# Patient Record
Sex: FEMALE | Race: WHITE
[De-identification: names, ages, dates, MRNs, and addresses within clinical notes are randomized per-mention and may not be internally consistent; named-entity substitution may affect disease eponyms.]

---

## 2018-08-24 ENCOUNTER — HOSPITAL ENCOUNTER (EMERGENCY)
Dept: HOSPITAL 80 - FED | Age: 28
Discharge: HOME | End: 2018-08-24
Payer: COMMERCIAL

## 2018-08-24 VITALS — SYSTOLIC BLOOD PRESSURE: 126 MMHG | DIASTOLIC BLOOD PRESSURE: 78 MMHG

## 2018-08-24 DIAGNOSIS — F32.89: Primary | ICD-10-CM

## 2018-08-24 DIAGNOSIS — E03.9: ICD-10-CM

## 2018-08-24 DIAGNOSIS — R45.851: ICD-10-CM

## 2018-08-24 LAB — PLATELET # BLD: 292 10^3/UL (ref 150–400)

## 2018-08-24 PROCEDURE — G0480 DRUG TEST DEF 1-7 CLASSES: HCPCS

## 2018-08-24 PROCEDURE — GZ11ZZZ PSYCHOLOGICAL TESTS, PERSONALITY AND BEHAVIORAL: ICD-10-PCS | Performed by: EMERGENCY MEDICINE

## 2018-08-24 NOTE — ASMTTCLDSP
[facility] Discharge Disposition

 

Disposition:                  Answers:  Discharge                             

If                            Answers:  Yes                                   

DISCHARGED: Patient/family                                                    

 given suicide hotline                                                        

info & SAMHSA brochure?                                                       

Disposition Notes:            

Notes:

In consultation with [facility] ED physician, Ayaz Carrillo MD, and on-call psychiatrist, Austin Thompson MD, both concurred that pt does not appear to meet 27-65 criteria requiring psychiatric 

hospitalization as pt does not appear to be an imminent risk of harm to self due to a mental 

illness condition.

Discharge                     

Concerns/Recommendations:     

Notes:

Pt and her  were coached around family therapy, transition issues, and coping strategies. Pt 


has resources available and outpatient appts made for Monday and Thursday. Pt contracts for safety 

and was given crisis resources should thoughts and impulses to act on them return. 

 

Date Signed:  08/24/2018 09:19 PM

Electronically Signed By:Castro Ibarra

## 2018-08-24 NOTE — EDPHY
H & P


Time Seen by Provider: 08/24/18 13:18


HPI/ROS: 





CHIEF COMPLAINT:  Depression, suicidal ideation





HISTORY OF PRESENT ILLNESS:  Patient is a 27-year-old female with a history of 

depression.  She presents to the emergency department because she is having 

increasing depression and suicidal thoughts.  She has no specific plan.  She 

denies any ingestion or self-harm.  She has had no previous suicide attempts.  

She has not changed any of her medications.  She denies drugs or alcohol.





REVIEW OF SYSTEMS:  


My complete review of systems is negative except as mentioned in the HPI.


Past Medical/Surgical History: 





Includes hypothyroidism





Past surgical history:  Includes tonsillectomy





Social history:  Patient denies drugs or alcohol.


Smoking Status: Never smoked


Physical Exam: 








GENERAL:  No acute distress, alert.


HEENT:  Eyes normal to inspection, normal pharynx, no signs of dehydration.


NECK:  [No thyromegaly, no lymphadenopathy, supple.


RESPIRATORY:  Clear to auscultation bilaterally, no rales, rhonchi or wheezing.


CVS:  Regular rate and rhythm, no rubs, murmurs, or gallops.


ABDOMEN:  Soft, nontender, nondistended, no organomegaly.


BACK:  Normal to inspection, no CVA tenderness.


SKIN:  Normal color, no rash, warm, dry.  No pallor.


EXTREMITIES:  No pedal edema, no calf tenderness, no Homans sign or cords, no 

joint swelling.


NEURO/PSYCH:  Alert and oriented x3, flat affect, normal motor sensory exam.  


Constitutional: 


 Initial Vital Signs











Temperature (C)  36.5 C   08/24/18 12:55


 


Heart Rate  89   08/24/18 12:55


 


Respiratory Rate  18   08/24/18 12:55


 


Blood Pressure  131/82 H  08/24/18 12:55


 


O2 Sat (%)  95   08/24/18 12:55








 











O2 Delivery Mode               Room Air














Allergies/Adverse Reactions: 


 





cefaclor [From Ceclor] Allergy (Verified 08/24/18 12:54)


 








Home Medications: 














 Medication  Instructions  Recorded


 


Levothyroxine  08/24/18














Medical Decision Making


ED Course/Re-evaluation: 





In the emergency department I discussed possible etiologies with the patient.  

I answered all her questions.  An IV was placed.  Laboratory studies were 

obtained.  Psychiatric consult was obtained.





Chemistry panel is unremarkable.  Alcohol is less than 10





Psychiatric Services evaluated the patient.  They felt this is adjustment 

disorder.  They felt the patient be safely discharged home.  This was discussed 

with Dr. Jama from Psychiatry.  Please refer the note.


Differential Diagnosis: 





My differential includes but is not limited to depression, suicidal ideation, 

thyroid disease, congestion, self-harm, toxication, drug use





- Data Points


Laboratory Results: 


 Laboratory Results





 08/24/18 13:02 





 08/24/18 13:20 





 











  08/24/18 08/24/18 08/24/18





  13:25 13:20 13:20


 


WBC      





    


 


RBC      





    


 


Hgb      





    


 


Hct      





    


 


MCV      





    


 


MCH      





    


 


MCHC      





    


 


RDW      





    


 


Plt Count      





    


 


MPV      





    


 


Neut % (Auto)      





    


 


Lymph % (Auto)      





    


 


Mono % (Auto)      





    


 


Eos % (Auto)      





    


 


Baso % (Auto)      





    


 


Nucleat RBC Rel Count      





    


 


Absolute Neuts (auto)      





    


 


Absolute Lymphs (auto)      





    


 


Absolute Monos (auto)      





    


 


Absolute Eos (auto)      





    


 


Absolute Basos (auto)      





    


 


Absolute Nucleated RBC      





    


 


Immature Gran %      





    


 


Immature Gran #      





    


 


Sodium      139 mEq/L mEq/L





     (135-145) 


 


Potassium      4.0 mEq/L mEq/L





     (3.3-5.0) 


 


Chloride      104 mEq/L mEq/L





     () 


 


Carbon Dioxide      22 mEq/l mEq/l





     (22-31) 


 


Anion Gap      13 mEq/L mEq/L





     (8-16) 


 


BUN      20 mg/dL mg/dL





     (7-23) 


 


Creatinine      0.8 mg/dL mg/dL





     (0.6-1.0) 


 


Estimated GFR      > 60 





    


 


Glucose      105 mg/dL H mg/dL





     () 


 


Calcium      9.9 mg/dL mg/dL





     (8.5-10.4) 


 


TSH      1.080 uIU/mL uIU/mL





     (0.465-4.680) 


 


Beta HCG, Qual    NEGATIVE   





    


 


Urine Opiates Screen  NEGATIVE     





   (NEGATIVE)   


 


Urine Barbiturates  NEGATIVE     





   (NEGATIVE)   


 


Ur Phencyclidine Scrn  NEGATIVE     





   (NEGATIVE)   


 


Ur Amphetamine Screen  NEGATIVE     





   (NEGATIVE)   


 


U Benzodiazepines Scrn  NON-NEGATIVE  H     





   (NEGATIVE)   


 


Urine Cocaine Screen  NEGATIVE     





   (NEGATIVE)   


 


U Marijuana (THC) Screen  NEGATIVE     





   (NEGATIVE)   


 


Ethyl Alcohol      < 10 mg/dL mg/dL





     (0-10) 














  08/24/18





  13:02


 


WBC  6.27 10^3/uL 10^3/uL





   (3.80-9.50) 


 


RBC  4.70 10^6/uL 10^6/uL





   (4.18-5.33) 


 


Hgb  14.2 g/dL g/dL





   (12.6-16.3) 


 


Hct  41.6 % %





   (38.0-47.0) 


 


MCV  88.5 fL fL





   (81.5-99.8) 


 


MCH  30.2 pg pg





   (27.9-34.1) 


 


MCHC  34.1 g/dL g/dL





   (32.4-36.7) 


 


RDW  12.4 % %





   (11.5-15.2) 


 


Plt Count  292 10^3/uL 10^3/uL





   (150-400) 


 


MPV  11.1 fL fL





   (8.7-11.7) 


 


Neut % (Auto)  49.3 % %





   (39.3-74.2) 


 


Lymph % (Auto)  36.4 % %





   (15.0-45.0) 


 


Mono % (Auto)  9.4 % %





   (4.5-13.0) 


 


Eos % (Auto)  3.7 % %





   (0.6-7.6) 


 


Baso % (Auto)  1.0 % %





   (0.3-1.7) 


 


Nucleat RBC Rel Count  0.0 % %





   (0.0-0.2) 


 


Absolute Neuts (auto)  3.10 10^3/uL 10^3/uL





   (1.70-6.50) 


 


Absolute Lymphs (auto)  2.28 10^3/uL 10^3/uL





   (1.00-3.00) 


 


Absolute Monos (auto)  0.59 10^3/uL 10^3/uL





   (0.30-0.80) 


 


Absolute Eos (auto)  0.23 10^3/uL 10^3/uL





   (0.03-0.40) 


 


Absolute Basos (auto)  0.06 10^3/uL 10^3/uL





   (0.02-0.10) 


 


Absolute Nucleated RBC  0.00 10^3/uL 10^3/uL





   (0-0.01) 


 


Immature Gran %  0.2 % %





   (0.0-1.1) 


 


Immature Gran #  0.01 10^3/uL 10^3/uL





   (0.00-0.10) 


 


Sodium  





  


 


Potassium  





  


 


Chloride  





  


 


Carbon Dioxide  





  


 


Anion Gap  





  


 


BUN  





  


 


Creatinine  





  


 


Estimated GFR  





  


 


Glucose  





  


 


Calcium  





  


 


TSH  





  


 


Beta HCG, Qual  





  


 


Urine Opiates Screen  





  


 


Urine Barbiturates  





  


 


Ur Phencyclidine Scrn  





  


 


Ur Amphetamine Screen  





  


 


U Benzodiazepines Scrn  





  


 


Urine Cocaine Screen  





  


 


U Marijuana (THC) Screen  





  


 


Ethyl Alcohol  





  














Departure





- Departure


Disposition: Home, Routine, Self-Care


Clinical Impression: 


Depression


Qualifiers:


 Depression Type: other depression Qualified Code(s): F32.89 - Other specified 

depressive episodes





Condition: Good


Instructions:  Depression (ED)


Additional Instructions: 


Return with increasing depression, thoughts of hurting yourself or someone else

, or any other concerns.


Referrals: 


[facility] [Medical Doctor] - 3-4 days, if not improved

## 2018-08-24 NOTE — ASMTTLCEVL
[facility] Evaluation - Basic Information

 

Evaluation Start Date and     08/24/2018 04:00 PM

Time                          

Hospital Status               Answers:  Voluntary                             

Patient statement             

Notes:

I'm here because I've been depressed for 3 months and I'd just like a pill or something to make me 

feel better"

Narrative                     

Notes:

Pt is a 26 yo caucassion female  with no children, employed, masters level 

education, presenting voluntarily to the ED via private vehicle for a mental health eval due to 

persistent depression.

Diagnosis History             

Notes:

No previous dx hx. Pt reported shes been feeling depressed for 3 months since moving to [place] 

from [place]

Prior suicide attempts        

Notes:

None Reported; however pt did report last night she had taken out a knife with the intention of 

cutting to relieve pain and only selfharm, but her  found out and was upset 

Prior hospitalizations        

Notes:

None reported

Treatment Responses           

Notes:

Pt reported that she had been seeing a therapist she didnt' vibe with and stopped going 3 weeks ago 


but she had a new appt with a different one on monday. Pt also had an appt scheduled with her 

family psychiatirst back in [place] on Thursday. 

History of violence           

Notes:

None reported

Therapist:                    New one Monday

Psychiatrist:                 Family psychiatrist in [place]

Medications                   

(name, dosage, route, freq    

uency)                        

Notes:

Atavan 1mg every 4 hours prn to sleep. 

Allergies/Reaction            

Notes:

Ceclor, Biaxin and glutin

Sleep                         

Notes:

8-9 hours a night but feels she sleeps too much

Appetite                      

Notes:

Low

Medical/Surgical history      

Notes:

None Reported

Substance use history         

(frequency, intensity, his    

tory, duration)               

Notes:

None Reported

Family composition            

Notes:

Pt's parents are together and she has a younger brother and older sister. 

Need for family               Answers:  Yes                                   

participation in                                                              

patient's care                                                                

Family                        

psychiatric/substance         

abuse history                 

Notes:

Pt's father has bipolar, pt's mother has anxiety, pt's sister has a panic disorder, pt's brother 

has depression. 

Developmental history         

Notes:

Pt denied any previous developmental hx; denied add, adhd, denied TBI, LOC, Concussions, and denied 


childhood abuse. 

Abuse concerns                Answers:  None                                  

Marital status/children       

Notes:

 no children. 

Sexual                        

history/orientation           

Notes:

Heterosexual and Active 

Education level/history       

Notes:

BA psychology; MA in public health administration. 

Work history                  

Notes:

Employed as a clinical abstractionist. 

                      

Notes:

None reported

Legal                         

Notes:

None reported

Restorationism/Spiritual           

Notes:

Pt is spiritual 

Leisure                       

Notes:

Travel, Spending time with family, netflix, jazercise, and animals. 

Collateral                    

Notes:

Pt's  whom was present at bedside. 

Patient's strengths           Answers:  Funny/Using Humor                     

(Please select at least                                                       

TWO strengths):                                                               

                                        Good Friend to Others                 

                                        Honest                                

                                        Insightful                            

                                        Intelligent                           

                                        [place]                                 

                                        Motivated for Treatment               

                                        Responsible/Dependable                

                                        Supportive/Compassionate              

                                        Supportive Family                     

                                        Willingness                           

[facility] Evaluation - Mental Status Exam

 

Appearance:                   Answers:  Appropriate                           

                                        Clean                                 

                                        Well Groomed                          

                                        Neat                                  

Eye Contact:                  Answers:  Good/Direct                           

Mood:                         Answers:  Depressed                             

                                        Irritable                             

                                        Sad                                   

Affect:                       Answers:  Appropriate                           

                                        Blunted                               

                                        Sad                                   

Behavior:                     Answers:  Appropriate                           

                                        Cooperative                           

                                        Talkative                             

Speech:                       Answers:  Relevant                              

                                        Logical                               

                                        Clear                                 

                                        Coherent                              

Thought Process:              Answers:  Organized                             

                                        Oriented                              

                                        Alert                                 

                                        Goal Oriented                         

Insight:                      Answers:  Good                                  

Judgement:                    Answers:  Good                                  

Manic Signs/Symptoms          Answers:  Irritability                          

Depression                    Answers:  Crying Spells                         

Signs/Symptoms:                                                               

                                        Difficulty Concentrating              

                                        Diminished Interest                   

                                        Diminished Pleasure                   

                                        Hopelessness                          

                                        Psychomotor Agitation                 

                                        Psychomotor Retardation               

                                        Sad Mood                              

                                        Withdrawn                             

Anxiety Signs/Symptoms        Answers:  Generalized Anxiety                   

Hallucinations:               Answers:  None                                  

Pt reported to have           Answers:  Yes                                   

suicidal/self-injuring                                                        

ideation/behavior?                                                            

Pt reported to be making      Answers:  No                                    

suicidal/self-injuring                                                        

threats?                                                                      

Pt reported to be making      Answers:  No                                    

aggression/assault                                                            

threats?                                                                      

Pt exhibits inability to      Answers:  No                                    

care for self/grave                                                           

disability?                                                                   

Ideation/behavior is          Answers:  No                                    

chronic?                                                                      

Ideation has                  Answers:  No                                    

delusional/hallucinatory                                                      

content?                                                                      

History of                    Answers:  No                                    

suicidal/self-injuring                                                        

ideation, behavior, or                                                        

threats?                                                                      

History of serious            Answers:  No                                    

physical harm to                                                              

self/others while in                                                          

treatment setting?                                                            

[facility] Evaluation - Suicide/Homicide Risk

 

Suicide Risk Factors:         Answers:  Anhedonia                             

                                        Hopelessness                          

                                        Problems with Partner                 

                                        Self-Harm Behaviors                   

Current Suicidal              Answers:  No                                    

Ideation?                                                                     

Current Suicidal Ideation     Answers:  No                                    

in the Past 48 Hours?                                                         

Current Suicidal Ideation     Answers:  Yes                                   

in the Past Month?                                                            

Current Suicidal              Answers:  No                                    

Ideation, Worst Ever?                                                         

Suicide Internal              Answers:  Absence of Psychosis                  

Protective Factors:                                                           

                                        Frustration Tolerance                 

                                        [place] with Stress                     

                                        Restorationism Beliefs                     

Suicide External              Answers:  Positive Therapeutic                  

Protective Factors:                     Relationships                         

                                        Social Support                        

Ranking of patient's          Answers:  Low                                   

suicidal risk:                                                                

Ranking of patient's          Answers:  Low                                   

homicidal risk:                                                               

[facility] Evaluation - Wrap-up

 

BDI Total Score:              42

BDI Question #2 Score:        3

BDI Question #9 Score:        1

BSS Total Score:              0

AXIS I Diagnosis (include     

DSM-V and ICD-10              

codes), must also be          

entered in                    

[employer], which is the        

source of truth.              

Notes:

F43.23 Adjustment disorder with mixed anxiety and depressed mood

Evaluation End Date and       08/24/2018 07:00 PM

Time (HH:MM):                 

 

Date Signed:  08/24/2018 09:15 PM

Electronically Signed By:Castro Ibarra